# Patient Record
Sex: FEMALE | URBAN - NONMETROPOLITAN AREA
[De-identification: names, ages, dates, MRNs, and addresses within clinical notes are randomized per-mention and may not be internally consistent; named-entity substitution may affect disease eponyms.]

---

## 2021-12-16 ENCOUNTER — OFFICE VISIT (OUTPATIENT)
Dept: URBAN - NONMETROPOLITAN AREA CLINIC 5 | Facility: CLINIC | Age: 66
End: 2021-12-16
Payer: COMMERCIAL

## 2021-12-16 ENCOUNTER — DASHBOARD ENCOUNTERS (OUTPATIENT)
Age: 66
End: 2021-12-16

## 2021-12-16 VITALS
WEIGHT: 168 LBS | BODY MASS INDEX: 25.46 KG/M2 | DIASTOLIC BLOOD PRESSURE: 74 MMHG | OXYGEN SATURATION: 98 % | SYSTOLIC BLOOD PRESSURE: 120 MMHG | HEART RATE: 83 BPM | HEIGHT: 68 IN

## 2021-12-16 DIAGNOSIS — K64.4 EXTERNAL HEMORRHOIDS WITH COMPLICATION: ICD-10-CM

## 2021-12-16 DIAGNOSIS — Z80.0 FAMILY HISTORY OF MALIGNANT NEOPLASM OF DIGESTIVE ORGANS: ICD-10-CM

## 2021-12-16 DIAGNOSIS — Z86.010 PERSONAL HISTORY OF COLONIC POLYPS: ICD-10-CM

## 2021-12-16 PROBLEM — 428283002: Status: ACTIVE | Noted: 2021-12-16

## 2021-12-16 PROCEDURE — 99213 OFFICE O/P EST LOW 20 MIN: CPT | Performed by: PHYSICIAN ASSISTANT

## 2021-12-16 RX ORDER — SIMVASTATIN 10 MG/1
1 TABLET IN THE EVENING TABLET, FILM COATED ORAL ONCE A DAY
Qty: 30 | Status: ACTIVE | COMMUNITY

## 2021-12-16 RX ORDER — MULTIVIT-MIN/IRON/FOLIC ACID/K 18-600-40
AS DIRECTED CAPSULE ORAL
Status: ACTIVE | COMMUNITY

## 2021-12-16 RX ORDER — POLYETHYLENE GLYCOL 3350, SODIUM SULFATE, SODIUM CHLORIDE, POTASSIUM CHLORIDE, ASCORBIC ACID, SODIUM ASCORBATE 140-9-5.2G
AS DIRECTED KIT ORAL AS DIRECTED
Qty: 1 | Refills: 0 | Status: ON HOLD | COMMUNITY
Start: 2019-06-11

## 2021-12-16 RX ORDER — DOCUSATE SODIUM 100 MG/1
1 CAPSULE AS NEEDED CAPSULE, LIQUID FILLED ORAL ONCE A DAY
Status: ACTIVE | COMMUNITY

## 2021-12-16 RX ORDER — SODIUM, POTASSIUM,MAG SULFATES 17.5-3.13G
177 ML SOLUTION, RECONSTITUTED, ORAL ORAL
Qty: 1 KIT | Refills: 0 | Status: ON HOLD | COMMUNITY
Start: 2019-08-12

## 2021-12-16 RX ORDER — PRASTERONE (DHEA)/CAL PHOS 25-43MG
AS DIRECTED TABLET ORAL
Status: ACTIVE | COMMUNITY

## 2021-12-16 RX ORDER — CHOLECALCIFEROL (VITAMIN D3) 50 MCG
1 TABLET TABLET ORAL ONCE A DAY
Status: ACTIVE | COMMUNITY

## 2021-12-16 RX ORDER — OMEPRAZOLE MAGNESIUM 20.6 MG/1
1 TABLET 30 MINUTES BEFORE MORNING MEAL TABLET, DELAYED RELEASE ORAL ONCE A DAY
Status: ACTIVE | COMMUNITY

## 2021-12-16 RX ORDER — BACILLUS COAGULANS/INULIN 1B-250 MG
AS DIRECTED CAPSULE ORAL
Status: ACTIVE | COMMUNITY

## 2021-12-16 RX ORDER — OMEGA-3 FATTY ACIDS/FISH OIL 300-1000MG
1 CAPSULE CAPSULE ORAL ONCE A DAY
Status: ACTIVE | COMMUNITY

## 2021-12-16 NOTE — HPI-COLONOSCOPY SCREENING
last visit 06/11/2019 Patient is a 63-year-old  female who presents today for a consultation for a colonoscopy. This is the patient's first colonoscopy. Brother with history  of colon cancer. Maternal aunt with history of colon cancer. Patient currently admits one bowel movement per day. Patient has felt constipated within the last 6 months. She will take a stool softener as needed and a digestive supplement which will help to regulate her bowel movements. She states that her hemorrhoids will flare up when she feels constipated at which time she will experience rectal itching/burning, at times.  Stools are formed and normal. She will experience bloating occasional depending on what she eats. She has been experiencing heartburn for the past 10 years and she will take a Prilosec 20 mg one tab every morning with control of symptoms. She has never had an EGD.

## 2021-12-16 NOTE — HPI-HEMORRHOIDS
66 year old female presents today for a consult for hemorrhoids. She admits a h/o hemorrhoids. She admits she had felt the hemorrhoids externally. She admits her hemorrhoids bleed at times. The blood is bright red and is present  On tissue but not within the toilet bowl.  Patient currently admits 4 bowel movements per week. Stools vary from hard with strain to soft and formed at times.  She denies any associated symptoms of mucus, melena. She admits pruritus ani, rectal pain.   Patient has tried Preparation H and Tucks that helps at times and anal creme that was prescribed by her PCP with little relief of symptoms.  She states any cream seems to make it worse.  She has been taking stool softeners as well, and can be constipated.  She has also done Sitz baths.  She has a colonoscopy completed on 08/19/2019 with Dr kraus revealing adenoma polyps and first degree hemorrhoids, she was placed on a 3 yr surveillance.

## 2022-01-12 ENCOUNTER — TELEPHONE ENCOUNTER (OUTPATIENT)
Dept: URBAN - METROPOLITAN AREA CLINIC 36 | Facility: CLINIC | Age: 67
End: 2022-01-12

## 2022-01-12 PROBLEM — 23913003: Status: ACTIVE | Noted: 2022-01-12
